# Patient Record
Sex: FEMALE | Race: WHITE | NOT HISPANIC OR LATINO
[De-identification: names, ages, dates, MRNs, and addresses within clinical notes are randomized per-mention and may not be internally consistent; named-entity substitution may affect disease eponyms.]

---

## 2018-01-25 ENCOUNTER — RECORDS - HEALTHEAST (OUTPATIENT)
Dept: ADMINISTRATIVE | Facility: OTHER | Age: 68
End: 2018-01-25

## 2018-01-25 ASSESSMENT — MIFFLIN-ST. JEOR: SCORE: 1545.33

## 2018-01-30 ENCOUNTER — SURGERY - HEALTHEAST (OUTPATIENT)
Dept: SURGERY | Facility: CLINIC | Age: 68
End: 2018-01-30

## 2018-01-30 ENCOUNTER — ANESTHESIA - HEALTHEAST (OUTPATIENT)
Dept: SURGERY | Facility: CLINIC | Age: 68
End: 2018-01-30

## 2018-02-06 ENCOUNTER — RECORDS - HEALTHEAST (OUTPATIENT)
Dept: ADMINISTRATIVE | Facility: OTHER | Age: 68
End: 2018-02-06

## 2018-02-13 ENCOUNTER — RECORDS - HEALTHEAST (OUTPATIENT)
Dept: ADMINISTRATIVE | Facility: OTHER | Age: 68
End: 2018-02-13

## 2018-02-20 ENCOUNTER — RECORDS - HEALTHEAST (OUTPATIENT)
Dept: ADMINISTRATIVE | Facility: OTHER | Age: 68
End: 2018-02-20

## 2018-02-21 ENCOUNTER — OFFICE VISIT - HEALTHEAST (OUTPATIENT)
Dept: INFECTIOUS DISEASES | Facility: CLINIC | Age: 68
End: 2018-02-21

## 2018-02-21 ENCOUNTER — RECORDS - HEALTHEAST (OUTPATIENT)
Dept: ADMINISTRATIVE | Facility: OTHER | Age: 68
End: 2018-02-21

## 2018-02-21 DIAGNOSIS — A49.01 MSSA (METHICILLIN SUSCEPTIBLE STAPHYLOCOCCUS AUREUS) INFECTION: ICD-10-CM

## 2018-02-21 DIAGNOSIS — Z96.649 S/P REVISION OF TOTAL HIP: ICD-10-CM

## 2018-02-21 DIAGNOSIS — T84.59XD INFECTION OF PROSTHETIC HIP JOINT, SUBSEQUENT ENCOUNTER: ICD-10-CM

## 2018-02-21 DIAGNOSIS — Z79.2 RECEIVING INTRAVENOUS ANTIBIOTIC TREATMENT AS OUTPATIENT: ICD-10-CM

## 2018-02-21 DIAGNOSIS — Z96.649 INFECTION OF PROSTHETIC HIP JOINT, SUBSEQUENT ENCOUNTER: ICD-10-CM

## 2018-02-21 ASSESSMENT — MIFFLIN-ST. JEOR: SCORE: 1531.73

## 2018-02-22 ENCOUNTER — RECORDS - HEALTHEAST (OUTPATIENT)
Dept: ADMINISTRATIVE | Facility: OTHER | Age: 68
End: 2018-02-22

## 2018-02-27 ENCOUNTER — RECORDS - HEALTHEAST (OUTPATIENT)
Dept: ADMINISTRATIVE | Facility: OTHER | Age: 68
End: 2018-02-27

## 2018-03-09 ENCOUNTER — RECORDS - HEALTHEAST (OUTPATIENT)
Dept: ADMINISTRATIVE | Facility: OTHER | Age: 68
End: 2018-03-09

## 2018-03-27 ASSESSMENT — MIFFLIN-ST. JEOR: SCORE: 1486.37

## 2018-04-03 ENCOUNTER — ANESTHESIA - HEALTHEAST (OUTPATIENT)
Dept: SURGERY | Facility: CLINIC | Age: 68
End: 2018-04-03

## 2018-04-03 ENCOUNTER — SURGERY - HEALTHEAST (OUTPATIENT)
Dept: SURGERY | Facility: CLINIC | Age: 68
End: 2018-04-03

## 2018-04-03 ASSESSMENT — MIFFLIN-ST. JEOR: SCORE: 1486.37

## 2021-05-31 VITALS — BODY MASS INDEX: 39.27 KG/M2 | WEIGHT: 230 LBS | HEIGHT: 64 IN

## 2021-05-31 VITALS — HEIGHT: 64 IN | WEIGHT: 227 LBS | BODY MASS INDEX: 38.76 KG/M2

## 2021-06-01 VITALS — WEIGHT: 217 LBS | BODY MASS INDEX: 37.05 KG/M2 | HEIGHT: 64 IN

## 2021-06-15 NOTE — ANESTHESIA POSTPROCEDURE EVALUATION
Patient: Kenyetta Burr  RIGHT HIP - STAGE 1 TOTAL HIP ARTHROPLASTY REVISION  Anesthesia type: general    Patient location: PACU  Last vitals:   Vitals:    01/30/18 2000   BP: 119/62   Pulse: 88   Resp: 12   Temp:    SpO2: 94%     Post vital signs: stable  Level of consciousness: awake and responds to simple questions  Post-anesthesia pain: pain controlled  Post-anesthesia nausea and vomiting: no  Pulmonary: unassisted, return to baseline  Cardiovascular: stable and blood pressure at baseline  Hydration: adequate  Anesthetic events: no    QCDR Measures:  ASA# 11 - Junie-op Cardiac Arrest: ASA11B - Patient did NOT experience unanticipated cardiac arrest  ASA# 12 - Junie-op Mortality Rate: ASA12B - Patient did NOT die  ASA# 13 - PACU Re-Intubation Rate: ASA13B - Patient did NOT require a new airway mgmt  ASA# 10 - Composite Anes Safety: ASA10A - No serious adverse event    Additional Notes:  No complications.

## 2021-06-15 NOTE — ANESTHESIA PREPROCEDURE EVALUATION
Anesthesia Evaluation      Patient summary reviewed   No history of anesthetic complications     Airway   Mallampati: I  Neck ROM: full   Pulmonary - negative ROS    breath sounds clear to auscultation                         Cardiovascular - negative ROS and normal exam  (+) hypertension well controlled, ,     Rhythm: regular  Rate: normal,         Neuro/Psych - negative ROS     Endo/Other - negative ROS      GI/Hepatic/Renal    (+) GERD well controlled,             Dental    (+) upper dentures                       Anesthesia Plan  Planned anesthetic: general endotracheal    ASA 3     Anesthetic plan and risks discussed with: patient    Post-op plan: routine recovery

## 2021-06-15 NOTE — ANESTHESIA CARE TRANSFER NOTE
Last vitals:   Vitals:    01/30/18 1844   BP: 128/60   Pulse: 75   Resp: 12   Temp: 36.8  C (98.2  F)   SpO2: 100%     Patient's level of consciousness is drowsy  Spontaneous respirations: yes  Maintains airway independently: yes  Dentition unchanged: yes  Oropharynx: oropharynx clear of all foreign objects    QCDR Measures:  ASA# 20 - Surgical Safety Checklist: WHO surgical safety checklist completed prior to induction  PQRS# 430 - Adult PONV Prevention: 4558F - Pt received => 2 anti-emetic agents (different classes) preop & intraop  ASA# 8 - Peds PONV Prevention: 4558F - Pt received => 2 anti-emetic agents (different classes) preop & intraop  PQRS# 424 - Junie-op Temp Management: 4559F - At least one body temp DOCUMENTED => 35.5C or 95.9F within required timeframe  PQRS# 426 - PACU Transfer Protocol: - Transfer of care checklist used  ASA# 14 - Acute Post-op Pain: ASA14B - Patient did NOT experience pain >= 7 out of 10

## 2021-06-16 PROBLEM — A49.01 MSSA (METHICILLIN SUSCEPTIBLE STAPHYLOCOCCUS AUREUS) INFECTION: Status: ACTIVE | Noted: 2018-02-21

## 2021-06-16 PROBLEM — T84.59XA INFECTED PROSTHETIC HIP (H): Status: ACTIVE | Noted: 2018-02-21

## 2021-06-16 PROBLEM — Z79.2 RECEIVING INTRAVENOUS ANTIBIOTIC TREATMENT AS OUTPATIENT: Status: ACTIVE | Noted: 2018-02-21

## 2021-06-16 PROBLEM — Z96.649 S/P REVISION OF TOTAL HIP: Status: ACTIVE | Noted: 2018-01-30

## 2021-06-16 PROBLEM — Z96.649 INFECTED PROSTHETIC HIP (H): Status: ACTIVE | Noted: 2018-02-21

## 2021-06-16 NOTE — PROGRESS NOTES
[facility] Infectious Disease  Followup Visit        Assessment:     Right prosthetic hip infection with MSSA as documented by arthrocentesis on 1/17 and operative cultures 1/30/18.     Status post hip explant 1/30, :  1.  Stage 1 explant of right hip replacement and placement of antibiotic (tobramycin) spacer.  2.  Open reduction, internal fixation, right proximal femur fracture.         Plan:   Continue IV ceftriaxone  Plan 6 weeks  ending 3/13, then DC PICC  With weekly labs  Wrote orders fro TCU  Afterwards recommend waiting at least 2 weeks off antibiotics with assessment for infection before reimplantation    Brian Siegel M.D.        Present Illness:   This is a 68 years old female presenting for hospital follow-up.  Is currently at [facility].  She is a case of infected right prosthetic hip found to be secondary to MSSA both on arthrocentesis preop and intraoperative culture.  In the hospital she underwent explant and was discharged on IV ceftriaxone which she has been taking  She reports no nausea vomiting skin rash itching or diarrhea.  She is getting Vicodin for pain  Here is the hospital note:  66 yo female who on December 28 started feeling severe pain in the right hip.  She initially went to emergency care with the prescribed opiates.  Then she was hospitalized [facility] where a arthrocentesis was done and grew that grew Staphylococcus aureus MSSA.  She actually was discharged on oral antibiotics.  She then follow-up with orthopedics who referred her for admission.      LABS reviewed:  CRP 5.9 creatinine 0.5 ALT less than 10 WBC 11 down from 16 platelets 640.    Review of Systems  Performed and all negative except as mentioned above.    Past medical, family, and social history reviewed, unchanged from before.        Physical Exam:     General Appearance:  Alert, cooperative, no distress, appears stated age    Head:  Normocephalic, without obvious abnormality,  atraumatic   Neck no stiffness or adenopathy  Eyes no conjunctivitis or icterus   Oral cavity no thrush , ulcers or exudates    CV Leg edema               Extremities:   Right hip is visualized.  The incision is completely healed no tenderness no redness no swelling   Skin:  Skin color, texture, turgor normal, no rashes or lesions    Neurologic:  Alert and oriented X 3, Moves all 4 extremities        DATA     Results for orders placed or performed during the hospital encounter of 01/30/18   Culture, Anaerobic   Result Value Ref Range    Culture No anaerobic organisms isolated    Culture, Fungus, Fluid/Tissue   Result Value Ref Range    Culture No Fungus isolated    Culture, Anaerobic   Result Value Ref Range    Culture No anaerobic organisms isolated    Culture, Fungus, Fluid/Tissue   Result Value Ref Range    Culture No Fungus isolated    Culture, Anaerobic   Result Value Ref Range    Culture No anaerobic organisms isolated    Culture, Fungus, Fluid/Tissue   Result Value Ref Range    Culture No Fungus isolated    Culture/Gram Stain: Tissue   Result Value Ref Range    Culture 1+ Coagulase positive Staphylococcus (!)     Gram Stain Result 3+ Polymorphonuclear leukocytes     Gram Stain Result No organisms seen    Culture/Gram Stain: Tissue   Result Value Ref Range    Culture 2+ Staphylococcus aureus (!)     Gram Stain Result 2+ Polymorphonuclear leukocytes     Gram Stain Result No organisms seen        Susceptibility    Staphylococcus aureus - YOLANDA     Clindamycin <=0.5 Sensitive      Cefazolin <=2 Sensitive      Doxycycline <=0.5 Sensitive      Erythromycin <=0.5 Sensitive      Levofloxacin <=0.5 Sensitive      Oxacillin <=0.25 Sensitive      Trimethoprim + Sulfamethoxazole <=1/19 Sensitive      Vancomycin 1 Sensitive    Culture/Gram Stain: Tissue   Result Value Ref Range    Culture 1+ Staphylococcus aureus (!)     Gram Stain Result 2+ Polymorphonuclear leukocytes     Gram Stain Result No organisms seen    INR  (Baseline for all patients undergoing hip or knee procedures)   Result Value Ref Range    INR 1.06 0.90 - 1.10   Type and Screen (order for total hip, total shoulder and shoulder hemiarthroplasty)   Result Value Ref Range    ABORh A POS     Antibody Screen Negative Negative   HM2(CBC w/o Differential)   Result Value Ref Range    WBC 16.7 (H) 4.0 - 11.0 thou/uL    RBC 4.06 3.80 - 5.40 mill/uL    Hemoglobin 11.6 (L) 12.0 - 16.0 g/dL    Hematocrit 35.2 35.0 - 47.0 %    MCV 87 80 - 100 fL    MCH 28.6 27.0 - 34.0 pg    MCHC 33.0 32.0 - 36.0 g/dL    RDW 13.7 11.0 - 14.5 %    Platelets 626 (H) 140 - 440 thou/uL    MPV 8.9 8.5 - 12.5 fL   Hemoglobin   Result Value Ref Range    Hemoglobin 7.5 (L) 12.0 - 16.0 g/dL   Crossmatch   Result Value Ref Range    Crossmatch Compatible     Blood Expiration Date [id number]     Unit Type A Pos     Unit Number [id number]     Status Transfused     Component Red Blood Cells     PRODUCT CODE B6022A63     Issue Date and Time [id number]     Blood Type 6200     CODING SYSTEM [id number]    Crossmatch   Result Value Ref Range    Crossmatch Compatible     Blood Expiration Date [id number]     Unit Type A Pos     Unit Number [id number]     Status Transfused     Component Red Blood Cells     PRODUCT CODE F2087K79     Issue Date and Time [id number]     Blood Type 6200     CODING SYSTEM [id number]    Hemoglobin   Result Value Ref Range    Hemoglobin 8.8 (L) 12.0 - 16.0 g/dL   Creatinine   Result Value Ref Range    Creatinine 0.50 (L) 0.60 - 1.10 mg/dL    GFR MDRD Af Amer >60 >60 mL/min/1.73m2    GFR MDRD Non Af Amer >60 >60 mL/min/1.73m2   Urinalysis-UC if Indicated   Result Value Ref Range    Color, UA Straw Colorless, Yellow, Straw, Light Yellow    Clarity, UA Clear Clear    Glucose, UA Negative Negative    Bilirubin, UA Negative Negative    Ketones, UA Negative Negative    Specific Gravity, UA 1.005 1.001 - 1.030    Blood, UA Negative Negative    pH, UA 6.0 4.5 - 8.0    Protein, UA  Negative Negative mg/dL    Urobilinogen, UA <2.0 E.U./dL <2.0 E.U./dL, 2.0 E.U./dL    Nitrite, UA Negative Negative    Leukocytes, UA Negative Negative   Hemoglobin - Daily x 2   Result Value Ref Range    Hemoglobin 7.9 (L) 12.0 - 16.0 g/dL   Potassium - Tomorrow AM   Result Value Ref Range    Potassium 4.9 3.5 - 5.0 mmol/L   INR   Result Value Ref Range    INR 1.28 (H) 0.90 - 1.10   Hemoglobin - Daily x 2   Result Value Ref Range    Hemoglobin 6.8 (LL) 12.0 - 16.0 g/dL   INR   Result Value Ref Range    INR 2.12 (H) 0.90 - 1.10   Hemoglobin   Result Value Ref Range    Hemoglobin 7.8 (L) 12.0 - 16.0 g/dL   Crossmatch   Result Value Ref Range    Crossmatch Compatible     Blood Expiration Date [id number]     Unit Type A Pos     Unit Number [id number]     Status Transfused     Component Red Blood Cells     PRODUCT CODE F3093S86     Issue Date and Time [id number]     Blood Type 6200     CODING SYSTEM [id number]    INR   Result Value Ref Range    INR 2.11 (H) 0.90 - 1.10         Brian Siegel MD

## 2021-06-17 NOTE — ANESTHESIA POSTPROCEDURE EVALUATION
Patient: Kenyetta Burr  RIGHT TOTAL HIP ARTHROPLASTY REVISION, STAGE 2  Anesthesia type: general    Patient location: PACU  Last vitals:   Vitals:    04/03/18 1600   BP: 138/67   Pulse: 81   Resp: 15   Temp: 36.8  C (98.3  F)   SpO2: 98%     Post vital signs: stable  Level of consciousness: awake and responds to simple questions  Post-anesthesia pain: pain controlled  Post-anesthesia nausea and vomiting: no  Pulmonary: unassisted, return to baseline  Cardiovascular: stable and blood pressure at baseline  Hydration: adequate  Anesthetic events: no    QCDR Measures:  ASA# 11 - Junie-op Cardiac Arrest: ASA11B - Patient did NOT experience unanticipated cardiac arrest  ASA# 12 - Junie-op Mortality Rate: ASA12B - Patient did NOT die  ASA# 13 - PACU Re-Intubation Rate: ASA13B - Patient did NOT require a new airway mgmt  ASA# 10 - Composite Anes Safety: ASA10A - No serious adverse event    Additional Notes:

## 2021-06-17 NOTE — ANESTHESIA PREPROCEDURE EVALUATION
Anesthesia Evaluation      Patient summary reviewed   No history of anesthetic complications     Airway   Mallampati: II   Pulmonary - negative ROS and normal exam    breath sounds clear to auscultation                         Cardiovascular   (+) hypertension, ,     Rhythm: regular  Rate: normal,         Neuro/Psych      Endo/Other    (+) hypothyroidism,      GI/Hepatic/Renal    (+) GERD,             Dental    (+) upper dentures                       Anesthesia Plan  Planned anesthetic: general endotracheal    ASA 3     Anesthetic plan and risks discussed with: patient    Post-op plan: routine recovery

## 2021-06-17 NOTE — ANESTHESIA CARE TRANSFER NOTE
Last vitals:   Vitals:    04/03/18 1523   BP: 131/64   Pulse: 81   Resp: 16   Temp: 36.4  C (97.6  F)   SpO2: 98%     Patient's level of consciousness is drowsy  Spontaneous respirations: yes  Maintains airway independently: yes  Dentition unchanged: yes  Oropharynx: oropharynx clear of all foreign objects    QCDR Measures:  ASA# 20 - Surgical Safety Checklist: WHO surgical safety checklist completed prior to induction  PQRS# 430 - Adult PONV Prevention: 4558F - Pt received => 2 anti-emetic agents (different classes) preop & intraop  ASA# 8 - Peds PONV Prevention: NA - Not pediatric patient, not GA or 2 or more risk factors NOT present  PQRS# 424 - Junie-op Temp Management: 4559F - At least one body temp DOCUMENTED => 35.5C or 95.9F within required timeframe  PQRS# 426 - PACU Transfer Protocol: - Transfer of care checklist used  ASA# 14 - Acute Post-op Pain: ASA14B - Patient did NOT experience pain >= 7 out of 10

## 2021-07-03 NOTE — ADDENDUM NOTE
Addendum Note by Casey Barr CRNA at 4/3/2018  4:39 PM     Author: Casey Barr CRNA Service: -- Author Type: Nurse Anesthetist    Filed: 4/3/2018  4:39 PM Date of Service: 4/3/2018  4:39 PM Status: Signed    : Casey Barr CRNA (Nurse Anesthetist)       Addendum  created 04/03/18 1639 by Casey Barr CRNA    Anesthesia Intra Flowsheets edited